# Patient Record
(demographics unavailable — no encounter records)

---

## 2024-11-28 NOTE — HISTORY OF PRESENT ILLNESS
[FreeTextEntry8] : MS. MORRISON IS A PLEASANT 83 YO PRESENTING FOR ACUTE VISIT.  HAS HAD A PAIN TO RIGHT UPPER ABDOMEN FOR APPROXIMATELY ONE WEEK.  NO N/V/D.  NO BLOOD IN STOOL.  NORMAL BOWEL MOVEMENT.  NO INJURY.  NO URINARY COMPALINTS.  NO COUGH, CHEST PAIN OR SHORTNESS OF BREATH.  NO RASHES.  THOUGHT MAYBE SHE PULLED SOMETHING GARDENING.  SOMETIMES HURTS WITH MOVEMENT.  NO PAIN AT REST.  ONLY WITH MOVEMENT.  HISTORY OF GALLSTONES WITH PRIOR SURGERY EVALUATION.  CHOLECYSTECTOMY WAS DECLINED AT THAT TIME.

## 2024-11-28 NOTE — HISTORY OF PRESENT ILLNESS
[FreeTextEntry8] : MS. MORRISON IS A PLEASANT 81 YO PRESENTING FOR ACUTE VISIT.  HAS HAD A PAIN TO RIGHT UPPER ABDOMEN FOR APPROXIMATELY ONE WEEK.  NO N/V/D.  NO BLOOD IN STOOL.  NORMAL BOWEL MOVEMENT.  NO INJURY.  NO URINARY COMPALINTS.  NO COUGH, CHEST PAIN OR SHORTNESS OF BREATH.  NO RASHES.  THOUGHT MAYBE SHE PULLED SOMETHING GARDENING.  SOMETIMES HURTS WITH MOVEMENT.  NO PAIN AT REST.  ONLY WITH MOVEMENT.  HISTORY OF GALLSTONES WITH PRIOR SURGERY EVALUATION.  CHOLECYSTECTOMY WAS DECLINED AT THAT TIME.

## 2024-11-28 NOTE — PHYSICAL EXAM
[No Acute Distress] : no acute distress [Well Nourished] : well nourished [Well-Appearing] : well-appearing [Normal Sclera/Conjunctiva] : normal sclera/conjunctiva [PERRL] : pupils equal round and reactive to light [EOMI] : extraocular movements intact [No Respiratory Distress] : no respiratory distress  [No Accessory Muscle Use] : no accessory muscle use [Clear to Auscultation] : lungs were clear to auscultation bilaterally [Normal Rate] : normal rate  [Regular Rhythm] : with a regular rhythm [Normal S1, S2] : normal S1 and S2 [Soft] : abdomen soft [Non Tender] : non-tender [Normal Bowel Sounds] : normal bowel sounds [No Joint Swelling] : no joint swelling [Grossly Normal Strength/Tone] : grossly normal strength/tone [Deep Tendon Reflexes (DTR)] : deep tendon reflexes were 2+ and symmetric

## 2024-11-28 NOTE — PLAN
[FreeTextEntry1] : MONITOR FOR TRIGGERS WILL CHECK LAB WORK STOOL OCCULT WILL SEND FOR ABDOMINAL US AGREES TO CONSIDER ADDITIONAL TESTING IF AS NEEDED CALL WITH ANY QUESTIONS, CONCERNS OR CHANGES

## 2024-12-12 NOTE — ASSESSMENT
[FreeTextEntry1] : The patient is an 82-year-old female with right upper abdominal pain and gallstones.  The patient understands that cholecystectomy would be of benefit but wishes to manage conservatively with diet modification.  The risks, benefits, and alternatives including the option of doing nothing to a robotic, possible laparoscopic, and possible open cholecystectomy were discussed.  The potential complications including but not limited to infection, bleeding, bile leak, bowel injury and/or obstruction, chronic post op or recurrent abdominal pain, and possible bile duct injury were discussed.  The patient also understands that post op dietary tolerances and bowel movements may also be affected and different from prior to surgery.  The patient understands and wishes to proceed conservatively at this time.  She will follow up in one week or sooner should any problems arise.  She also understands that should the symptoms persist or worsen then cholecystectomy would be of benefit. A total of 20 minutes was spent coordinating the patient's care.

## 2024-12-12 NOTE — PHYSICAL EXAM
[No Rash or Lesion] : No rash or lesion [Purpura] : no purpura  [Petechiae] : no petechiae [Skin Ulcer] : no ulcer [Skin Induration] : no induration [Alert] : alert [Oriented to Person] : oriented to person [Oriented to Place] : oriented to place [Oriented to Time] : oriented to time [Calm] : calm [de-identified] : non toxic, in no acute distress [de-identified] : NC/AT PERRL EOMI no scleral icterus [de-identified] : soft, obese, mild right upper abdominal tenderness to deep palpation, no guarding, no rebound, no masses [de-identified] : FROM of all extremities, no abdominal wall herniation noted [de-identified] : mood is calm

## 2024-12-12 NOTE — DATA REVIEWED
[FreeTextEntry1] : independent review of the abdominal ultrasound reveals a contacted gallbladder with stones no wall thickening.

## 2024-12-12 NOTE — HISTORY OF PRESENT ILLNESS
[de-identified] : The patient comes to the office today with a known history of gallstones stating that she has had right upper abdominal discomfort for the past 3 weeks. She states that the discomfort is improving at this point and she is without fever, chills, nausea, or vomit.

## 2025-02-03 NOTE — ASSESSMENT
[FreeTextEntry1] : The patient is an 82-year-old female with gallstones and no abdominal pain at this point.  She states that she is eating without any pain.  We will manage the gallstones conservatively at this time as we discussed the option of choelcystectomy and she prefers no surgery if she has no pain. She will follow up if she develops symptoms.  A total of 20 minutes was spent coordinating the patient's care.

## 2025-02-03 NOTE — HISTORY OF PRESENT ILLNESS
[de-identified] : The patient returns to the office with no abdominal pain, nausea, or vomit. She states that she has been able to eat without ill effects.  She states she overall been feeling well.

## 2025-02-03 NOTE — PHYSICAL EXAM
[No Rash or Lesion] : No rash or lesion [Purpura] : no purpura  [Petechiae] : no petechiae [Skin Ulcer] : no ulcer [Skin Induration] : no induration [Alert] : alert [Oriented to Person] : oriented to person [Oriented to Place] : oriented to place [Oriented to Time] : oriented to time [Calm] : calm [de-identified] : non toxic, in no acute distress [de-identified] : NC/AT PERRL EOMI no scleral icterus [de-identified] : FROM of all extremities, no abdominal wall herniation noted [de-identified] : soft, obese, no localizing tenderness, no guarding, no rebound, no masses [de-identified] : mood is calm

## 2025-03-17 NOTE — HISTORY OF PRESENT ILLNESS
[de-identified] : saw surgery discuss weight loss medication with daughter portions are good.  balanced.  but eats sweets not moving like she had been.  aids couple days a week for  whoe is sick. seeing nutritionist clear with dr. epps due to gallbladder sister thyroid cancer - find out which one

## 2025-06-26 NOTE — PHYSICAL EXAM
[Normal] : clear lung fields, good air entry, no respiratory distress [No Edema] : no edema [Normal Radial B/L] : normal radial B/L [Moves all extremities] : moves all extremities [Alert and Oriented] : alert and oriented [de-identified] : limited gait

## 2025-06-26 NOTE — PHYSICAL EXAM
[Normal] : clear lung fields, good air entry, no respiratory distress [No Edema] : no edema [Normal Radial B/L] : normal radial B/L [Moves all extremities] : moves all extremities [Alert and Oriented] : alert and oriented [de-identified] : limited gait

## 2025-06-26 NOTE — PHYSICAL EXAM
[Normal] : clear lung fields, good air entry, no respiratory distress [No Edema] : no edema [Normal Radial B/L] : normal radial B/L [Moves all extremities] : moves all extremities [Alert and Oriented] : alert and oriented [de-identified] : limited gait

## 2025-06-26 NOTE — DISCUSSION/SUMMARY
[EKG obtained to assist in diagnosis and management of assessed problem(s)] : EKG obtained to assist in diagnosis and management of assessed problem(s) [FreeTextEntry1] : 1. Hypertension- mildly elevated but stable in the past. BP machine at home. Once a week bp checks at home 130s. She will call if home BPs become elevated. Continue current amlodipine benzapril 5-10mg daily. Plan for repeat TTE.  2. HLD: Stable. Check labs at next follow up with Dr. Dejesus.  3. Health maintenance- Order CMP, CBC, TSH, lipid panel.  3. Follow up in 1 year.

## 2025-06-26 NOTE — END OF VISIT
[FreeTextEntry4] :  I, Jennifer Charlton, am scribing for and in the presence of  in the following sections HISTORY OF PRESENT ILLNESSS, PAST MEDICAL/FAMILY/SOCIAL HISTORY; REVIEW OF SYSTEMS; VITAL SIGNS; PHYSICAL EXAM; ASSESSMENT/PLAN     [FreeTextEntry3] : I, Ang Zuñiga , personally performed the services described in this documentation. All medical record entries made by the scribe were at my direction and in my presence. I have reviewed the chart and agree that the record reflects my personal performance and is accurate and complete.

## 2025-06-26 NOTE — HISTORY OF PRESENT ILLNESS
[FreeTextEntry1] : Patient with history of hypertension and hld presenting with routine evaluation.  Patient was followed by Dr. Medardo Roach and now establishing care. Was followed for routine cardiac care.  Physically active taking care of her .  Works 3 days per week.  No symptoms of chest pressure/chest pain.   No previous significant cardiac history.   3/2022- Mild shortness of breath with moderate activity.   7/2023- Denies any chest pain, no new symptoms. Very busy with her . Full care. Right lower extremity edema.   7/2024- normal NST last year  6/2025- No new symptoms. BLLE edema at baseline. She denies overt HF symptoms specifically CP, palpitations, SOB, dizziness/LH.

## 2025-06-26 NOTE — PHYSICAL EXAM
[Normal] : clear lung fields, good air entry, no respiratory distress [No Edema] : no edema [Normal Radial B/L] : normal radial B/L [Moves all extremities] : moves all extremities [Alert and Oriented] : alert and oriented [de-identified] : limited gait